# Patient Record
Sex: MALE | Race: WHITE | NOT HISPANIC OR LATINO | Employment: UNEMPLOYED | URBAN - METROPOLITAN AREA
[De-identification: names, ages, dates, MRNs, and addresses within clinical notes are randomized per-mention and may not be internally consistent; named-entity substitution may affect disease eponyms.]

---
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
## 2019-02-25 ENCOUNTER — APPOINTMENT (OUTPATIENT)
Dept: RADIOLOGY | Facility: MEDICAL CENTER | Age: 19
End: 2019-02-25
Payer: COMMERCIAL

## 2019-02-25 ENCOUNTER — OFFICE VISIT (OUTPATIENT)
Dept: URGENT CARE | Facility: MEDICAL CENTER | Age: 19
End: 2019-02-25
Payer: COMMERCIAL

## 2019-02-25 VITALS
DIASTOLIC BLOOD PRESSURE: 80 MMHG | TEMPERATURE: 97.1 F | WEIGHT: 172 LBS | RESPIRATION RATE: 16 BRPM | HEART RATE: 62 BPM | SYSTOLIC BLOOD PRESSURE: 122 MMHG | BODY MASS INDEX: 24.08 KG/M2 | OXYGEN SATURATION: 99 % | HEIGHT: 71 IN

## 2019-02-25 DIAGNOSIS — S60.00XA: ICD-10-CM

## 2019-02-25 DIAGNOSIS — S60.221A: ICD-10-CM

## 2019-02-25 DIAGNOSIS — S69.91XA INJURY OF RIGHT HAND, INITIAL ENCOUNTER: Primary | ICD-10-CM

## 2019-02-25 DIAGNOSIS — S09.90XA INJURY OF HEAD, INITIAL ENCOUNTER: ICD-10-CM

## 2019-02-25 PROCEDURE — 99213 OFFICE O/P EST LOW 20 MIN: CPT | Performed by: FAMILY MEDICINE

## 2019-02-25 PROCEDURE — 73130 X-RAY EXAM OF HAND: CPT

## 2019-02-25 PROCEDURE — S9088 SERVICES PROVIDED IN URGENT: HCPCS | Performed by: FAMILY MEDICINE

## 2019-02-25 NOTE — PROGRESS NOTES
[facility]        NAME: Saeed Strickland is a 22 y o  male  : 2000    MRN: [id number]  DATE: 2019  TIME: 6:14 PM    Assessment and Plan   Injury of right hand, initial encounter [S69 91XA]  1  Injury of right hand, initial encounter  XR hand 3+ vw right   2  Contusion of multiple sites of hand and fingers, right, initial encounter           Patient Instructions       Follow up with PCP in 3-5 days  Proceed to  ER if symptoms worsen  Chief Complaint     Chief Complaint   Patient presents with    Hand Injury     Pt in ultimate frisbee tournament over the weekend and repeatedly landed on right hand while catching [employer]  Pt now c/o of edema in right hand and 4/10 pain with   History of Present Illness       Hand 2year-old male here today with complaint of right hand pain  Playing was playing [employer] golf when he injured repeatedly landing on his right hand outstretched in the dorsal aspect  He injured both 2 days ago and then yesterday  However he is also sustained some swelling and bruising the back part of his hand  Complaining of pain of the right 5th knuckle  He is right handed  He did apply ice but also took some Tylenol for some pain  No previous injuries to his right hand in the past       Review of Systems   Review of Systems   Musculoskeletal: Positive for arthralgias and joint swelling  Current Medications     No current outpatient medications on file  Current Allergies     Allergies as of 2019    (No Known Allergies)            The following portions of the patient's history were reviewed and updated as appropriate: allergies, current medications, past family history, past medical history, past social history, past surgical history and problem list      History reviewed  No pertinent past medical history  History reviewed  No pertinent surgical history  No family history on file        Medications have been verified  Objective   /80   Pulse 62   Temp (!) 97 1 °F (36 2 °C)   Resp 16   Ht 5' 11" (1 803 m)   Wt 78 kg (172 lb)   SpO2 99%   BMI 23 99 kg/m²        Physical Exam     Physical Exam   Musculoskeletal: He exhibits edema and tenderness  Right hand:  Full range on flexion and extension  Noticeable swelling erythema and ecchymosis of the dorsal aspect between the right middle and 5th finger  Point tenderness of the proximal PIP joint  There is good tactile sensation capillary refill distal to the injury

## 2019-02-25 NOTE — PATIENT INSTRUCTIONS
X-ray right hand reveals no fracture subluxation   I wrap the patient's right hand with 4 in Ace wrap  Advised to apply ice as needed hourly for 10-15 minutes  Keep right hand elevated and suggest ibuprofen for pain and swelling  Hand Sprain   WHAT YOU NEED TO KNOW:   A hand sprain is when a ligament in your hand is stretched or torn  Ligaments are the strong tissues that connect bones  A hand sprain is usually caused by a fall onto your outstretched arm  You may have bruising, pain, and swelling of your injured hand  DISCHARGE INSTRUCTIONS:   Rest your hand: You will need to rest your hand for 1 to 2 days after your injury  This will help decrease the risk of more damage to your hand  Do not lift anything with your injured hand  Ask your healthcare provider when you can return to your normal activities  Ice your hand:  Ice your hand to help decrease swelling and pain  Put crushed ice in a plastic bag and cover it with a towel  Put the ice on your hand for 15 to 20 minutes every hour  Use ice as directed  Use compression:  Compression (tight hold) provides support and helps decrease swelling and movement so your hand can heal  You may need to keep your hand wrapped with an elastic bandage  Elevate your hand:  Keep your injured hand raised above the level of your heart as often as you can  This will help decrease or limit swelling  You can elevate your hand by resting your arm up on a pillow  Use a splint:  You may need to use a splint on your hand and wrist  A splint is a special device that keeps your hand and wrist from moving  Use the splint as directed  Medicines:   · NSAIDs  help decrease swelling and pain or fever  This medicine is available with or without a doctor's order  NSAIDs can cause stomach bleeding or kidney problems in certain people  If you take blood thinner medicine, always ask your healthcare provider if NSAIDs are safe for you   Always read the medicine label and follow directions  · Take your medicine as directed:  Call your healthcare provider if you think your medicine is not helping or if you have side effects  Tell him if you are taking any vitamins, herbs, or other medicines  Keep a list of the medicines you take  Include the amounts, and when and why you take them  Bring the list or the pill bottles to follow up visits  Exercise your hand: You may be given exercises to improve your strength once you are able to move your hand without pain  Exercises will also help decrease stiffness  Start your exercises and normal activities slowly  Exercise your hand as directed  Follow up with your healthcare provider as directed:  Write down your questions you so you remember to ask them during your visits  Call your healthcare provider if:   · The skin of your injured hand looks bluish or pale (less color than normal)  · You have increased swelling and pain in your hand  · You have new or increased numbness in your injured hand  · You have new or increased stiffness or trouble moving your injured hand  · You have questions or concerns about your injury or treatment  © 2017 [facility] Information is for End User's use only and may not be sold, redistributed or otherwise used for commercial purposes  All illustrations and images included in CareNotes® are the copyrighted property of [employer] Inc  or [employer]  The above information is an  only  It is not intended as medical advice for individual conditions or treatments  Talk to your doctor, nurse or pharmacist before following any medical regimen to see if it is safe and effective for you